# Patient Record
Sex: MALE | Race: WHITE | Employment: FULL TIME | URBAN - METROPOLITAN AREA
[De-identification: names, ages, dates, MRNs, and addresses within clinical notes are randomized per-mention and may not be internally consistent; named-entity substitution may affect disease eponyms.]

---

## 2021-02-01 DIAGNOSIS — Z23 NEED FOR VACCINATION: ICD-10-CM

## 2021-02-09 ENCOUNTER — IMMUNIZATION (OUTPATIENT)
Dept: LAB | Facility: HOSPITAL | Age: 51
End: 2021-02-09
Attending: HOSPITALIST
Payer: COMMERCIAL

## 2021-02-09 DIAGNOSIS — Z23 NEED FOR VACCINATION: Primary | ICD-10-CM

## 2021-02-09 PROCEDURE — 0011A SARSCOV2 VAC 100MCG/0.5ML IM: CPT

## 2021-03-09 ENCOUNTER — IMMUNIZATION (OUTPATIENT)
Dept: LAB | Facility: HOSPITAL | Age: 51
End: 2021-03-09
Attending: EMERGENCY MEDICINE
Payer: COMMERCIAL

## 2021-03-09 DIAGNOSIS — Z23 NEED FOR VACCINATION: Primary | ICD-10-CM

## 2021-03-09 PROCEDURE — 0012A SARSCOV2 VAC 100MCG/0.5ML IM: CPT

## 2021-04-23 ENCOUNTER — HOSPITAL ENCOUNTER (OUTPATIENT)
Age: 51
Discharge: HOME | End: 2021-04-23
Attending: PHYSICIAN ASSISTANT
Payer: COMMERCIAL

## 2021-04-23 VITALS
SYSTOLIC BLOOD PRESSURE: 136 MMHG | HEART RATE: 78 BPM | OXYGEN SATURATION: 96 % | RESPIRATION RATE: 18 BRPM | WEIGHT: 253 LBS | DIASTOLIC BLOOD PRESSURE: 81 MMHG | HEIGHT: 72 IN | BODY MASS INDEX: 34.27 KG/M2 | TEMPERATURE: 98 F

## 2021-04-23 DIAGNOSIS — S61.412A LACERATION OF LEFT HAND WITHOUT FOREIGN BODY, INITIAL ENCOUNTER: Primary | ICD-10-CM

## 2021-04-23 PROCEDURE — 90471 IMMUNIZATION ADMIN: CPT | Performed by: PHYSICIAN ASSISTANT

## 2021-04-23 PROCEDURE — 90715 TDAP VACCINE 7 YRS/> IM: CPT | Performed by: PHYSICIAN ASSISTANT

## 2021-04-23 PROCEDURE — 12001 RPR S/N/AX/GEN/TRNK 2.5CM/<: CPT | Performed by: PHYSICIAN ASSISTANT

## 2021-04-23 PROCEDURE — 99203 OFFICE O/P NEW LOW 30 MIN: CPT | Performed by: PHYSICIAN ASSISTANT

## 2021-04-23 NOTE — ED PROVIDER NOTES
Patient Seen in: [facility]    History   Patient presents with:  Arm or Hand Injury    Stated Complaint: hand injury     HPI    HPI:     49year old male who presents with chief complaint of left hand laceration.   Onset 1 hour prior to arriva (Temporal)   Resp 18   Ht 182.9 cm (6')   Wt 114.8 kg   SpO2 96%   BMI 34.31 kg/m²         Physical Exam      Constitutional: The patient is cooperative. Appears well-developed and well-nourished. No acute distress. Head: Normocephalic/atraumatic.    Neck amount of normal saline. Surgical glue utilized to repair wound. Skin well-approximated. Bleeding controlled. Laceration repair was simple. Patient tolerated procedure well without complication. MDM     Patient declined left hand x-ray.     Physical e

## 2021-07-26 ENCOUNTER — HOSPITAL ENCOUNTER (EMERGENCY)
Facility: HOSPITAL | Age: 51
Discharge: HOME | End: 2021-07-26
Attending: EMERGENCY MEDICINE
Payer: COMMERCIAL

## 2021-07-26 VITALS
BODY MASS INDEX: 36 KG/M2 | WEIGHT: 265 LBS | TEMPERATURE: 98 F | DIASTOLIC BLOOD PRESSURE: 95 MMHG | HEART RATE: 70 BPM | SYSTOLIC BLOOD PRESSURE: 155 MMHG | RESPIRATION RATE: 18 BRPM | OXYGEN SATURATION: 98 %

## 2021-07-26 DIAGNOSIS — H60.501 ACUTE OTITIS EXTERNA OF RIGHT EAR, UNSPECIFIED TYPE: Primary | ICD-10-CM

## 2021-07-26 PROCEDURE — 96372 THER/PROPH/DIAG INJ SC/IM: CPT

## 2021-07-26 PROCEDURE — 99283 EMERGENCY DEPT VISIT LOW MDM: CPT

## 2021-07-26 RX ORDER — TRAMADOL HYDROCHLORIDE 50 MG/1
TABLET ORAL EVERY 6 HOURS PRN
Qty: 10 TABLET | Refills: 0 | Status: SHIPPED | OUTPATIENT
Start: 2021-07-26 | End: 2021-08-02

## 2021-07-26 RX ORDER — KETOROLAC TROMETHAMINE 30 MG/ML
30 INJECTION, SOLUTION INTRAMUSCULAR; INTRAVENOUS ONCE
Status: COMPLETED | OUTPATIENT
Start: 2021-07-26 | End: 2021-07-26

## 2021-07-26 RX ORDER — CLINDAMYCIN HYDROCHLORIDE 300 MG/1
300 CAPSULE ORAL 3 TIMES DAILY
Qty: 12 CAPSULE | Refills: 0 | Status: SHIPPED | OUTPATIENT
Start: 2021-07-26 | End: 2021-07-30

## 2021-07-26 NOTE — ED INITIAL ASSESSMENT (HPI)
Patient complains of R ear pain for the past few days, dx with ear infection, on ab, states his symptoms have gotten worse [FreeTextEntry3] : I saw and examined this patient in person. I have discussed with Jaylene Guillen, Physician Assistant, in detail the above note and agree with the above assessment and plan of care.\par

## 2021-07-29 PROBLEM — H60.311 ACUTE DIFFUSE OTITIS EXTERNA OF RIGHT EAR: Status: ACTIVE | Noted: 2021-07-29

## 2021-07-29 PROBLEM — H93.299 ABNORMAL AUDITORY PERCEPTION, UNSPECIFIED LATERALITY: Status: ACTIVE | Noted: 2021-07-29

## 2021-09-13 NOTE — ED PROVIDER NOTES
Patient Seen in: [facility] AND [facility] Emergency Department    History   Patient presents with:  Ear Problem Pain      HPI    52-year-old male presents the ER with complaint of right ear pain. Patient states his been having pain now for the past 6 days.   Shanon negative. Constitutional and vital signs reviewed. Social History and Family History elements reviewed from today, pertinent positives to the presenting problem noted.     Physical Exam     ED Triage Vitals   BP 07/26/21 1201 (!) 160/105   Pulse 07/ Deep Tendon Reflexes: Reflexes are normal and symmetric. Psychiatric:         Behavior: Behavior normal.         Thought Content:  Thought content normal.         Judgment: Judgment normal.         ED Course      Labs Reviewed - No data to display visit  If symptoms worsen      Medications Prescribed:  Current Discharge Medication List    START taking these medications    !! Clindamycin HCl 300 MG Oral Cap  Take 1 capsule (300 mg total) by mouth 3 (three) times daily for 4 days.   Qty: 12 capsule Ref Skyrizi Counseling: I discussed with the patient the risks of risankizumab-rzaa including but not limited to immunosuppression, and serious infections.  The patient understands that monitoring is required including a PPD at baseline and must alert us or the primary physician if symptoms of infection or other concerning signs are noted.